# Patient Record
Sex: MALE | URBAN - METROPOLITAN AREA
[De-identification: names, ages, dates, MRNs, and addresses within clinical notes are randomized per-mention and may not be internally consistent; named-entity substitution may affect disease eponyms.]

---

## 2018-02-22 ENCOUNTER — TRANSFERRED RECORDS (OUTPATIENT)
Dept: HEALTH INFORMATION MANAGEMENT | Facility: CLINIC | Age: 48
End: 2018-02-22

## 2018-04-24 ENCOUNTER — OFFICE VISIT (OUTPATIENT)
Dept: OTOLARYNGOLOGY | Facility: OTHER | Age: 48
End: 2018-04-24
Attending: PHYSICIAN ASSISTANT
Payer: COMMERCIAL

## 2018-04-24 ENCOUNTER — RADIANT APPOINTMENT (OUTPATIENT)
Dept: GENERAL RADIOLOGY | Facility: OTHER | Age: 48
End: 2018-04-24
Attending: PHYSICIAN ASSISTANT
Payer: COMMERCIAL

## 2018-04-24 VITALS
HEIGHT: 71 IN | TEMPERATURE: 97.6 F | HEART RATE: 78 BPM | OXYGEN SATURATION: 94 % | BODY MASS INDEX: 31.5 KG/M2 | DIASTOLIC BLOOD PRESSURE: 90 MMHG | SYSTOLIC BLOOD PRESSURE: 134 MMHG | WEIGHT: 225 LBS

## 2018-04-24 DIAGNOSIS — R06.83 SNORING: ICD-10-CM

## 2018-04-24 DIAGNOSIS — R05.9 COUGH: Primary | ICD-10-CM

## 2018-04-24 DIAGNOSIS — J32.1 CHRONIC FRONTAL SINUSITIS: ICD-10-CM

## 2018-04-24 DIAGNOSIS — R07.0 THROAT PAIN: ICD-10-CM

## 2018-04-24 DIAGNOSIS — J34.2 DNS (DEVIATED NASAL SEPTUM): ICD-10-CM

## 2018-04-24 DIAGNOSIS — R53.83 FATIGUE, UNSPECIFIED TYPE: ICD-10-CM

## 2018-04-24 DIAGNOSIS — R51.9 GENERALIZED HEADACHE: ICD-10-CM

## 2018-04-24 DIAGNOSIS — R05.9 COUGH: ICD-10-CM

## 2018-04-24 LAB
ALBUMIN SERPL-MCNC: 4.1 G/DL (ref 3.4–5)
ALP SERPL-CCNC: 83 U/L (ref 40–150)
ALT SERPL W P-5'-P-CCNC: 64 U/L (ref 0–70)
ANION GAP SERPL CALCULATED.3IONS-SCNC: 6 MMOL/L (ref 3–14)
AST SERPL W P-5'-P-CCNC: 56 U/L (ref 0–45)
BASOPHILS # BLD AUTO: 0.1 10E9/L (ref 0–0.2)
BASOPHILS NFR BLD AUTO: 1.4 %
BILIRUB SERPL-MCNC: 0.8 MG/DL (ref 0.2–1.3)
BUN SERPL-MCNC: 16 MG/DL (ref 7–30)
CALCIUM SERPL-MCNC: 9.8 MG/DL (ref 8.5–10.1)
CHLORIDE SERPL-SCNC: 107 MMOL/L (ref 94–109)
CO2 SERPL-SCNC: 29 MMOL/L (ref 20–32)
CREAT SERPL-MCNC: 1.04 MG/DL (ref 0.66–1.25)
DIFFERENTIAL METHOD BLD: NORMAL
EOSINOPHIL # BLD AUTO: 0.3 10E9/L (ref 0–0.7)
EOSINOPHIL NFR BLD AUTO: 4.2 %
ERYTHROCYTE [DISTWIDTH] IN BLOOD BY AUTOMATED COUNT: 12.2 % (ref 10–15)
GFR SERPL CREATININE-BSD FRML MDRD: 76 ML/MIN/1.7M2
GLUCOSE SERPL-MCNC: 86 MG/DL (ref 70–99)
HCT VFR BLD AUTO: 45.3 % (ref 40–53)
HGB BLD-MCNC: 15.8 G/DL (ref 13.3–17.7)
IMM GRANULOCYTES # BLD: 0 10E9/L (ref 0–0.4)
IMM GRANULOCYTES NFR BLD: 0.7 %
LYMPHOCYTES # BLD AUTO: 2.7 10E9/L (ref 0.8–5.3)
LYMPHOCYTES NFR BLD AUTO: 45.1 %
MCH RBC QN AUTO: 31.2 PG (ref 26.5–33)
MCHC RBC AUTO-ENTMCNC: 34.9 G/DL (ref 31.5–36.5)
MCV RBC AUTO: 90 FL (ref 78–100)
MONOCYTES # BLD AUTO: 0.7 10E9/L (ref 0–1.3)
MONOCYTES NFR BLD AUTO: 11.3 %
NEUTROPHILS # BLD AUTO: 2.2 10E9/L (ref 1.6–8.3)
NEUTROPHILS NFR BLD AUTO: 37.3 %
NRBC # BLD AUTO: 0 10*3/UL
NRBC BLD AUTO-RTO: 0 /100
PLATELET # BLD AUTO: 221 10E9/L (ref 150–450)
POTASSIUM SERPL-SCNC: 3.6 MMOL/L (ref 3.4–5.3)
PROT SERPL-MCNC: 7.7 G/DL (ref 6.8–8.8)
RBC # BLD AUTO: 5.06 10E12/L (ref 4.4–5.9)
SODIUM SERPL-SCNC: 142 MMOL/L (ref 133–144)
WBC # BLD AUTO: 5.9 10E9/L (ref 4–11)

## 2018-04-24 PROCEDURE — 80053 COMPREHEN METABOLIC PANEL: CPT | Performed by: PHYSICIAN ASSISTANT

## 2018-04-24 PROCEDURE — 31575 DIAGNOSTIC LARYNGOSCOPY: CPT | Performed by: PHYSICIAN ASSISTANT

## 2018-04-24 PROCEDURE — 71046 X-RAY EXAM CHEST 2 VIEWS: CPT | Mod: TC

## 2018-04-24 PROCEDURE — 85025 COMPLETE CBC W/AUTO DIFF WBC: CPT | Performed by: PHYSICIAN ASSISTANT

## 2018-04-24 PROCEDURE — 36415 COLL VENOUS BLD VENIPUNCTURE: CPT | Performed by: PHYSICIAN ASSISTANT

## 2018-04-24 PROCEDURE — 99203 OFFICE O/P NEW LOW 30 MIN: CPT | Mod: 25 | Performed by: PHYSICIAN ASSISTANT

## 2018-04-24 ASSESSMENT — PAIN SCALES - GENERAL: PAINLEVEL: SEVERE PAIN (6)

## 2018-04-24 NOTE — LETTER
2018         RE: Cheo Solorzano  [address] [facility] [address]        Dear Colleague,    Thank you for referring your patient, Cheo Solorzano, to the [facility]. Please see a copy of my visit note below.    Otolaryngology Consultation    Patient: Cheo Solorzano  : 1970    Patient presents with:  Sinus Problem: Pt is here for crackling in nose, sore throat and headache.  Pt has had headaches for many months.  When he sleeps he can hear crackling in his sinuses.  Pt has been wheezing and sore throat.  Pt was put on a z-rodolfo with no help.      HPI:  Cheo Solorzano is a 48 year old male seen today for multiple complaints.   Cheo has concerns with crackling in his nose and it does awake him throughout the night.   He has symptoms for the last few months, but  He is able to breath thru nares.  He has facial pain/ pressure every AM. No past hx of sinusitis. Hx of DNS   Resolves with use of Tylenol or MOtrin.     No seasonal allergies. No sneezing, blowing nose. Mild post nasal drip.   He has headaches every AM. He has frontal facial pain/ bridge of nose. He uses Tylenol as needed. He sleeps- 6 hours/ night. He went to bed last night at 11- slept for about 1.5- 2 hours   He does snore, and in past has woken himself. Can not tell me his last dream.   He is not able to smell.   He has not been sleeping well. He awoke with feverish.   He has gained 15 pounds over the last few months.   No stress, anxiety or depression, or weight loss.     No dysphonia.   No tobacco use. Rare ETOH.   He denies reflux or heartburn.     Started on Z pack on Friday- at [facility].   He works at [employer] and does 12 hour shift- .   Sore throat intermittently. He feels throat pain w/ swallowing.       Current Outpatient Rx   Medication Sig Dispense Refill     Azithromycin (ZITHROMAX TRI-RODOLFO PO) Take 500 mg by mouth daily         Allergies: Review of patient's allergies indicates no known allergies.     History  "reviewed. No pertinent past medical history.    Past Surgical History:   Procedure Laterality Date     BACK SURGERY      x2     FOREARM SURGERY      tendon and ligament repair.     HERNIA REPAIR, INCISIONAL         ENT family history reviewed    Social History   Substance Use Topics     Smoking status: Never Smoker     Smokeless tobacco: Never Used     Alcohol use Yes       Review of Systems  ROS: 10 point ROS neg other than the symptoms noted above in the HPI     Physical Exam  /90 (BP Location: Right arm, Cuff Size: Adult Large)  Pulse 78  Temp 97.6  F (36.4  C) (Tympanic)  Ht 5' 11\" (1.803 m)  Wt 225 lb (102.1 kg)  SpO2 94%  BMI 31.38 kg/m2   While walking in exam room, patient was laying on floor/ resting. He does wake and returns to his chair.   General - The patient is well nourished and well developed, and appears to have good nutritional status.  Alert and oriented to person and place, answers questions and cooperates with examination appropriately.   Head and Face - Normocephalic and atraumatic, with no gross asymmetry noted.  The facial nerve is intact, with strong symmetric movements.  Voice and Breathing - The patient was breathing comfortably without the use of accessory muscles. There was no wheezing, stridor, or stertor.  The patients voice was clear and strong, and had appropriate pitch and quality.  Ears -The external auditory canals are patent, the tympanic membranes are intact without effusion, retraction or mass.  Bony landmarks are intact.  Eyes - Extraocular movements intact, and the pupils were reactive to light.  Sclera were not icteric or injected, conjunctiva were pink and moist.  Mouth - Examination of the oral cavity showed pink, healthy oral mucosa. No lesions or ulcerations noted.  The tongue was mobile and midline, and the dentition were in good condition.    Throat - The walls of the oropharynx were smooth, pink, moist, symmetric, and had no lesions or ulcerations.  The " tonsillar pillars and soft palate were symmetric.  The uvula was midline on elevation.  Tonsils grade 4.   Neck - Normal midline excursion of the laryngotracheal complex during swallowing.  Full range of motion on passive movement.  Palpation of the occipital, submental, submandibular, internal jugular chain, and supraclavicular nodes did not demonstrate any abnormal lymph nodes or masses.  Palpation of the thyroid was soft and smooth, with no nodules or goiter appreciated.  The trachea was mobile and midline.  Nose - External contour is symmetric, no gross deflection or scars.  Nasal mucosa is pink and moist with no abnormal mucus.  The septum and turbinates were evaluated:  DNS- w/ caudal septum defect to left.   Heart- Regular rate  Lungs- Clear anterior and posterior    Flexible Endoscopy -     Attempts at mirror laryngoscopy were not possible due to gag reflex.  Therefore I proceeded with a fiberoptic examination.  First I sprayed both sides of the nose with a mixture of lidocaine and neosynephrine.  I then passed the scope through the nasal cavity.  The nasal cavity was unremarkable.  The nasopharynx was mucosally covered and symmetric.  The Eustachian tube openings were unobstructed.  Going further down I had a clear view of the base of tongue which had normal appearing lingual tonsillar tissue.  The base of tongue was free of lesions, and the vallecula was open.  The epiglottis was smooth and mucosally covered.  The supraglottic larynx was then clearly visualized.  The vocal cords moved smoothly and symmetrically, they were pearly white and no lesions were seen.  The pyriform sinuses were open, and the limited view of the postcricoid region did not show any lesions.        ASSESSMENT:    ICD-10-CM    1. Cough R05 CBC with platelets differential     Comprehensive metabolic panel     XR Chest 2 Views   2. Throat pain R07.0 CBC with platelets differential     Comprehensive metabolic panel     XR Chest 2 Views    3. Chronic frontal sinusitis J32.1 CT Sinus w/o Contrast   4. DNS (deviated nasal septum) J34.2    5. Generalized headache R51 SLEEP EVALUATION & MANAGEMENT REFERRAL - [facility] [place] [phone] (Age 5 and up) (Requests Home study)   6. Fatigue, unspecified type R53.83 SLEEP EVALUATION & MANAGEMENT REFERRAL - [facility] [place] [phone] (Age 5 and up) (Requests Home study)   7. Snoring R06.83            Completed CXR- Normal  Labs- Within normal range.     Sinus CT and Sleep study pending  He should f/u or establish with PCP for wellness/ prevention/ labs. Also, if new symptoms develop fatigue/ dyspnea on exertion, etc he needs to see PCP.     He has several complaints regarding sleeping, wheezing during sleep, fatigue, headaches. I recommended sleep study due to several symptoms, and patient overall does not feel this is a cause of his symptoms. However, as noted he does have AM headaches, fatigue, poor sleep, etc. Home study would be warranted.     Sinus symptoms/ DNS reviewed with patient. Consider Septo/ TR in future. Start Tod med rinse daily.   Consider allergy testing if no improvement with above.     Recently finished Z pack. No evidence of sinusitis, strep on exam today. WBC within normal range. Good oral cares.       The possible diagnosis of sleep apnea, appropriate work-up and possible options were discussed.  A nocturnal polysomnogram was ordered with a possible CPAP trial.  A pamphlet reviewing the diagnosis was also reviewed and given to the patient to take home.  Highlighted recommendations included loss of weight, regular exercise, avoiding alcohol, sleeping pills or sedatives, use of CPAP and various surgical options.  The risk, benefit and surgical success rates of these options were relayed as well.        Kayleen Marrero PA-C  ENT  [facility], [place]  [phone]        Again, thank you for allowing me to participate in the care of your  patient.        Sincerely,        Kayleen Marrero PA-C

## 2018-04-24 NOTE — PATIENT INSTRUCTIONS
Complete Chest xray-  Labs drawn today-Will call with results  Sinus CT  Sleep study to be completed. They will call to schedule    Start Tod Med rinse- rinse 1-2 times daily.   Establish with a Primary Dr-     Thank you for allowing YANIQUE Blanco and our ENT team to participate in your care.  If your medications are too expensive, please give the nurse a call.  We can possibly change this medication.  If you have a scheduling or an appointment question please contact [facility] Coordinator at their direct line [phone].   ALL nursing questions or concerns can be directed to your ENT nurse at: [phone] Radha

## 2018-04-24 NOTE — PROGRESS NOTES
Otolaryngology Consultation    Patient: Cheo Solorzano  : 1970    Patient presents with:  Sinus Problem: Pt is here for crackling in nose, sore throat and headache.  Pt has had headaches for many months.  When he sleeps he can hear crackling in his sinuses.  Pt has been wheezing and sore throat.  Pt was put on a z-rodolfo with no help.      HPI:  Cheo Solorzano is a 48 year old male seen today for multiple complaints.   Cheo has concerns with crackling in his nose and it does awake him throughout the night.   He has symptoms for the last few months, but  He is able to breath thru nares.  He has facial pain/ pressure every AM. No past hx of sinusitis. Hx of DNS   Resolves with use of Tylenol or MOtrin.     No seasonal allergies. No sneezing, blowing nose. Mild post nasal drip.   He has headaches every AM. He has frontal facial pain/ bridge of nose. He uses Tylenol as needed. He sleeps- 6 hours/ night. He went to bed last night at 11- slept for about 1.5- 2 hours   He does snore, and in past has woken himself. Can not tell me his last dream.   He is not able to smell.   He has not been sleeping well. He awoke with feverish.   He has gained 15 pounds over the last few months.   No stress, anxiety or depression, or weight loss.     No dysphonia.   No tobacco use. Rare ETOH.   He denies reflux or heartburn.     Started on Z pack on Friday- at [facility].   He works at [employer] and does 12 hour shift- .   Sore throat intermittently. He feels throat pain w/ swallowing.       Current Outpatient Rx   Medication Sig Dispense Refill     Azithromycin (ZITHROMAX TRI-RODOLFO PO) Take 500 mg by mouth daily         Allergies: Review of patient's allergies indicates no known allergies.     History reviewed. No pertinent past medical history.    Past Surgical History:   Procedure Laterality Date     BACK SURGERY      x2     FOREARM SURGERY      tendon and ligament repair.     HERNIA REPAIR, INCISIONAL         ENT family  "history reviewed    Social History   Substance Use Topics     Smoking status: Never Smoker     Smokeless tobacco: Never Used     Alcohol use Yes       Review of Systems  ROS: 10 point ROS neg other than the symptoms noted above in the HPI     Physical Exam  /90 (BP Location: Right arm, Cuff Size: Adult Large)  Pulse 78  Temp 97.6  F (36.4  C) (Tympanic)  Ht 5' 11\" (1.803 m)  Wt 225 lb (102.1 kg)  SpO2 94%  BMI 31.38 kg/m2   While walking in exam room, patient was laying on floor/ resting. He does wake and returns to his chair.   General - The patient is well nourished and well developed, and appears to have good nutritional status.  Alert and oriented to person and place, answers questions and cooperates with examination appropriately.   Head and Face - Normocephalic and atraumatic, with no gross asymmetry noted.  The facial nerve is intact, with strong symmetric movements.  Voice and Breathing - The patient was breathing comfortably without the use of accessory muscles. There was no wheezing, stridor, or stertor.  The patients voice was clear and strong, and had appropriate pitch and quality.  Ears -The external auditory canals are patent, the tympanic membranes are intact without effusion, retraction or mass.  Bony landmarks are intact.  Eyes - Extraocular movements intact, and the pupils were reactive to light.  Sclera were not icteric or injected, conjunctiva were pink and moist.  Mouth - Examination of the oral cavity showed pink, healthy oral mucosa. No lesions or ulcerations noted.  The tongue was mobile and midline, and the dentition were in good condition.    Throat - The walls of the oropharynx were smooth, pink, moist, symmetric, and had no lesions or ulcerations.  The tonsillar pillars and soft palate were symmetric.  The uvula was midline on elevation.  Tonsils grade 4.   Neck - Normal midline excursion of the laryngotracheal complex during swallowing.  Full range of motion on passive " movement.  Palpation of the occipital, submental, submandibular, internal jugular chain, and supraclavicular nodes did not demonstrate any abnormal lymph nodes or masses.  Palpation of the thyroid was soft and smooth, with no nodules or goiter appreciated.  The trachea was mobile and midline.  Nose - External contour is symmetric, no gross deflection or scars.  Nasal mucosa is pink and moist with no abnormal mucus.  The septum and turbinates were evaluated:  DNS- w/ caudal septum defect to left.   Heart- Regular rate  Lungs- Clear anterior and posterior    Flexible Endoscopy -     Attempts at mirror laryngoscopy were not possible due to gag reflex.  Therefore I proceeded with a fiberoptic examination.  First I sprayed both sides of the nose with a mixture of lidocaine and neosynephrine.  I then passed the scope through the nasal cavity.  The nasal cavity was unremarkable.  The nasopharynx was mucosally covered and symmetric.  The Eustachian tube openings were unobstructed.  Going further down I had a clear view of the base of tongue which had normal appearing lingual tonsillar tissue.  The base of tongue was free of lesions, and the vallecula was open.  The epiglottis was smooth and mucosally covered.  The supraglottic larynx was then clearly visualized.  The vocal cords moved smoothly and symmetrically, they were pearly white and no lesions were seen.  The pyriform sinuses were open, and the limited view of the postcricoid region did not show any lesions.        ASSESSMENT:    ICD-10-CM    1. Cough R05 CBC with platelets differential     Comprehensive metabolic panel     XR Chest 2 Views   2. Throat pain R07.0 CBC with platelets differential     Comprehensive metabolic panel     XR Chest 2 Views   3. Chronic frontal sinusitis J32.1 CT Sinus w/o Contrast   4. DNS (deviated nasal septum) J34.2    5. Generalized headache R51 SLEEP EVALUATION & MANAGEMENT REFERRAL - [facility] [phone]  (Age 5 and up) (Requests Home study)   6. Fatigue, unspecified type R53.83 SLEEP EVALUATION & MANAGEMENT REFERRAL - [facility] [place] [phone] (Age 5 and up) (Requests Home study)   7. Snoring R06.83            Completed CXR- Normal  Labs- Within normal range.     Sinus CT and Sleep study pending  He should f/u or establish with PCP for wellness/ prevention/ labs. Also, if new symptoms develop fatigue/ dyspnea on exertion, etc he needs to see PCP.     He has several complaints regarding sleeping, wheezing during sleep, fatigue, headaches. I recommended sleep study due to several symptoms, and patient overall does not feel this is a cause of his symptoms. However, as noted he does have AM headaches, fatigue, poor sleep, etc. Home study would be warranted.     Sinus symptoms/ DNS reviewed with patient. Consider Septo/ TR in future. Start Tod med rinse daily.   Consider allergy testing if no improvement with above.     Recently finished Z pack. No evidence of sinusitis, strep on exam today. WBC within normal range. Good oral cares.       The possible diagnosis of sleep apnea, appropriate work-up and possible options were discussed.  A nocturnal polysomnogram was ordered with a possible CPAP trial.  A pamphlet reviewing the diagnosis was also reviewed and given to the patient to take home.  Highlighted recommendations included loss of weight, regular exercise, avoiding alcohol, sleeping pills or sedatives, use of CPAP and various surgical options.  The risk, benefit and surgical success rates of these options were relayed as well.        Kayleen Marrero PA-C  ENT  [facility], [place]  [phone]

## 2018-04-24 NOTE — MR AVS SNAPSHOT
After Visit Summary   4/24/2018    Cheo Solorzano    MRN: [id number]           Patient Information     Date Of Birth          1970        Visit Information        Provider Department      4/24/2018 1:45 PM Kayleen Marrero PA-C [facility]        Care Instructions    Complete Chest xray-  Labs drawn today-Will call with results  Sinus CT  Sleep study to be completed. They will call to schedule    Start Tod Med rinse- rinse 1-2 times daily.   Establish with a Primary Dr-     Thank you for allowing YANIQUE Blanco and our ENT team to participate in your care.  If your medications are too expensive, please give the nurse a call.  We can possibly change this medication.  If you have a scheduling or an appointment question please contact [facility] Coordinator at their direct line [phone].   ALL nursing questions or concerns can be directed to your ENT nurse at: [phone] Radha              Follow-ups after your visit        Who to contact     If you have questions or need follow up information about today's clinic visit or your schedule please contact [facility] directly at [phone].  Normal or non-critical lab and imaging results will be communicated to you by Enerpulsehart, letter or phone within 4 business days after the clinic has received the results. If you do not hear from us within 7 days, please contact the clinic through Enerpulsehart or phone. If you have a critical or abnormal lab result, we will notify you by phone as soon as possible.  Submit refill requests through YumZing or call your pharmacy and they will forward the refill request to us. Please allow 3 business days for your refill to be completed.          Additional Information About Your Visit        Enerpulsehart Information     YumZing lets you send messages to your doctor, view your test results, renew your prescriptions, schedule appointments and more. To sign up, go to [email or web address] .  "Click on \"Log in\" on the left side of the screen, which will take you to the Welcome page. Then click on \"Sign up Now\" on the right side of the page.     You will be asked to enter the access code listed below, as well as some personal information. Please follow the directions to create your username and password.     Your access code is: [email or web address]  Expires: 2018  2:25 PM     Your access code will  in 90 days. If you need help or a new code, please call your [facility] clinic or [phone].        Care EveryWhere ID     This is your Care EveryWhere ID. This could be used by other organizations to access your [facility] medical records  [id number]        Your Vitals Were     Pulse Temperature Height Pulse Oximetry BMI (Body Mass Index)       78 97.6  F (36.4  C) (Tympanic) 5' 11\" (1.803 m) 94% 31.38 kg/m2        Blood Pressure from Last 3 Encounters:   18 134/90    Weight from Last 3 Encounters:   18 225 lb (102.1 kg)              Today, you had the following     No orders found for display       Primary Care Provider Fax #    Physician No Ref-Primary [phone]       No address on file        Equal Access to Services     KALE RODRIGUEZ : Hadii monica ku hadasho Somadelynali, waaxda luqadaha, qaybta kaalmada adeegyada, charleen tavarez . So [facility] [phone].    ATENCIÓN: Si habla español, tiene a dalton disposición servicios gratuitos de asistencia lingüística. Llame al [phone].    We comply with applicable federal civil rights laws and [place] laws. We do not discriminate on the basis of race, color, national origin, age, disability, sex, sexual orientation, or gender identity.            Thank you!     Thank you for choosing [facility]  for your care. Our goal is always to provide you with excellent care. Hearing back from our patients is one way we can continue to improve our services. Please take a few minutes to complete the written survey that you may " receive in the mail after your visit with us. Thank you!             Your Updated Medication List - Protect others around you: Learn how to safely use, store and throw away your medicines at www.disposemymeds.org.          This list is accurate as of 4/24/18  2:25 PM.  Always use your most recent med list.                   Brand Name Dispense Instructions for use Diagnosis    ZITHROMAX TRI-RODOLFO PO      Take 500 mg by mouth daily

## 2018-04-24 NOTE — NURSING NOTE
"Chief Complaint   Patient presents with     Sinus Problem     Pt is here for crackling in nose, sore throat and headache.  Pt has had headaches for many months.  When he sleeps he can hear crackling in his sinuses.  Pt has been wheezing and sore throat.  Pt was put on a z-lorna with no help.       Initial /90 (BP Location: Right arm, Cuff Size: Adult Large)  Pulse 78  Temp 97.6  F (36.4  C) (Tympanic)  Ht 5' 11\" (1.803 m)  Wt 225 lb (102.1 kg)  SpO2 94%  BMI 31.38 kg/m2 Estimated body mass index is 31.38 kg/(m^2) as calculated from the following:    Height as of this encounter: 5' 11\" (1.803 m).    Weight as of this encounter: 225 lb (102.1 kg).  Medication Reconciliation: complete   Radha Tadeo LPN      "

## 2018-04-25 ENCOUNTER — TRANSFERRED RECORDS (OUTPATIENT)
Dept: HEALTH INFORMATION MANAGEMENT | Facility: CLINIC | Age: 48
End: 2018-04-25

## 2018-04-25 ENCOUNTER — TELEPHONE (OUTPATIENT)
Dept: OTOLARYNGOLOGY | Facility: OTHER | Age: 48
End: 2018-04-25

## 2018-04-25 DIAGNOSIS — J32.1 CHRONIC FRONTAL SINUSITIS: Primary | ICD-10-CM

## 2018-04-25 NOTE — TELEPHONE ENCOUNTER
I spoke with pt about his lab results.  He states that he would like to have his CT sinus completed in [place] instead.  I will place an order.

## 2018-05-02 ENCOUNTER — TELEPHONE (OUTPATIENT)
Dept: OTOLARYNGOLOGY | Facility: OTHER | Age: 48
End: 2018-05-02

## 2018-05-02 NOTE — TELEPHONE ENCOUNTER
Pt was notified of his Ct sinus results.  He was transferred to the [facility] to [place] an appointment with Dr. Plummer.  He does have an appointment with Dr. Erickson on 5/15 as well.